# Patient Record
Sex: MALE | Race: WHITE
[De-identification: names, ages, dates, MRNs, and addresses within clinical notes are randomized per-mention and may not be internally consistent; named-entity substitution may affect disease eponyms.]

---

## 2017-05-12 ENCOUNTER — HOSPITAL ENCOUNTER (OUTPATIENT)
Dept: HOSPITAL 83 - US | Age: 82
Discharge: HOME | End: 2017-05-12
Attending: INTERNAL MEDICINE
Payer: MEDICARE

## 2017-05-12 DIAGNOSIS — E55.9: Primary | ICD-10-CM

## 2018-01-18 ENCOUNTER — HOSPITAL ENCOUNTER (EMERGENCY)
Dept: HOSPITAL 83 - ED | Age: 83
Discharge: HOME | End: 2018-01-18
Payer: MEDICARE

## 2018-01-18 VITALS — HEIGHT: 60 IN | WEIGHT: 180 LBS

## 2018-01-18 DIAGNOSIS — J18.8: Primary | ICD-10-CM

## 2018-01-18 DIAGNOSIS — Z79.899: ICD-10-CM

## 2018-01-18 DIAGNOSIS — E11.9: ICD-10-CM

## 2018-01-18 DIAGNOSIS — E78.5: ICD-10-CM

## 2018-01-18 DIAGNOSIS — I10: ICD-10-CM

## 2018-01-18 DIAGNOSIS — M10.9: ICD-10-CM

## 2018-01-18 LAB
ALBUMIN SERPL-MCNC: 3.2 GM/DL (ref 3.1–4.5)
ALP SERPL-CCNC: 75 U/L (ref 45–117)
ALT SERPL W P-5'-P-CCNC: 25 U/L (ref 12–78)
AST SERPL-CCNC: 18 IU/L (ref 3–35)
BASOPHILS # BLD AUTO: 0 10*3/UL (ref 0–0.1)
BASOPHILS NFR BLD AUTO: 0.5 % (ref 0–1)
BUN SERPL-MCNC: 24 MG/DL (ref 7–24)
CHLORIDE SERPL-SCNC: 102 MMOL/L (ref 98–107)
CREAT SERPL-MCNC: 1.54 MG/DL (ref 0.7–1.3)
EOSINOPHIL # BLD AUTO: 0.1 10*3/UL (ref 0–0.4)
EOSINOPHIL # BLD AUTO: 0.8 % (ref 1–4)
ERYTHROCYTE [DISTWIDTH] IN BLOOD BY AUTOMATED COUNT: 12.4 % (ref 0–14.5)
HCT VFR BLD AUTO: 40.9 % (ref 42–52)
HGB BLD-MCNC: 14.3 G/DL (ref 14–18)
LYMPHOCYTES # BLD AUTO: 1.7 10*3/UL (ref 1.3–4.4)
LYMPHOCYTES NFR BLD AUTO: 22 % (ref 27–41)
MCH RBC QN AUTO: 31.2 PG (ref 27–31)
MCHC RBC AUTO-ENTMCNC: 35 G/DL (ref 33–37)
MCV RBC AUTO: 89.1 FL (ref 80–94)
MONOCYTES # BLD AUTO: 0.5 10*3/UL (ref 0.1–1)
MONOCYTES NFR BLD MANUAL: 7 % (ref 3–9)
NEUT #: 5.3 10*3/UL (ref 2.3–7.9)
NEUT %: 69.4 % (ref 47–73)
NRBC BLD QL AUTO: 0 10*3/UL (ref 0–0)
PLATELET # BLD AUTO: 239 10*3/UL (ref 130–400)
PMV BLD AUTO: 9.5 FL (ref 9.6–12.3)
POTASSIUM SERPL-SCNC: 4.2 MMOL/L (ref 3.5–5.1)
PROT SERPL-MCNC: 6.9 GM/DL (ref 6.4–8.2)
RBC # BLD AUTO: 4.59 10*6/UL (ref 4.5–5.9)
SODIUM SERPL-SCNC: 137 MMOL/L (ref 136–145)
TROPONIN I SERPL-MCNC: < 0.015 NG/ML (ref ?–0.04)
WBC NRBC COR # BLD AUTO: 7.6 10*3/UL (ref 4.8–10.8)

## 2018-05-28 ENCOUNTER — HOSPITAL ENCOUNTER (EMERGENCY)
Dept: HOSPITAL 83 - ED | Age: 83
Discharge: HOME | End: 2018-05-28
Payer: MEDICARE

## 2018-05-28 VITALS — HEIGHT: 60 IN | WEIGHT: 175 LBS

## 2018-05-28 DIAGNOSIS — J01.90: Primary | ICD-10-CM

## 2018-05-28 DIAGNOSIS — Z79.899: ICD-10-CM

## 2018-05-28 LAB
ALBUMIN SERPL-MCNC: 3.6 GM/DL (ref 3.1–4.5)
ALP SERPL-CCNC: 85 U/L (ref 45–117)
ALT SERPL W P-5'-P-CCNC: 36 U/L (ref 12–78)
AST SERPL-CCNC: 26 IU/L (ref 3–35)
BASOPHILS # BLD AUTO: 0.1 10*3/UL (ref 0–0.1)
BASOPHILS NFR BLD AUTO: 1.2 % (ref 0–1)
BUN SERPL-MCNC: 14 MG/DL (ref 7–24)
CHLORIDE SERPL-SCNC: 99 MMOL/L (ref 98–107)
CREAT SERPL-MCNC: 1.58 MG/DL (ref 0.7–1.3)
EOSINOPHIL # BLD AUTO: 0.2 10*3/UL (ref 0–0.4)
EOSINOPHIL # BLD AUTO: 3.9 % (ref 1–4)
ERYTHROCYTE [DISTWIDTH] IN BLOOD BY AUTOMATED COUNT: 12.5 % (ref 0–14.5)
HCT VFR BLD AUTO: 45.8 % (ref 42–52)
HGB BLD-MCNC: 15.5 G/DL (ref 14–18)
LYMPHOCYTES # BLD AUTO: 0.9 10*3/UL (ref 1.3–4.4)
LYMPHOCYTES NFR BLD AUTO: 15.3 % (ref 27–41)
MCH RBC QN AUTO: 30.8 PG (ref 27–31)
MCHC RBC AUTO-ENTMCNC: 33.8 G/DL (ref 33–37)
MCV RBC AUTO: 91.1 FL (ref 80–94)
MONOCYTES # BLD AUTO: 0.8 10*3/UL (ref 0.1–1)
MONOCYTES NFR BLD MANUAL: 12.3 % (ref 3–9)
NEUT #: 4.1 10*3/UL (ref 2.3–7.9)
NEUT %: 67.1 % (ref 47–73)
NRBC BLD QL AUTO: 0 10*3/UL (ref 0–0)
PLATELET # BLD AUTO: 214 10*3/UL (ref 130–400)
PMV BLD AUTO: 9.2 FL (ref 9.6–12.3)
POTASSIUM SERPL-SCNC: 3.9 MMOL/L (ref 3.5–5.1)
PROT SERPL-MCNC: 7.5 GM/DL (ref 6.4–8.2)
RBC # BLD AUTO: 5.03 10*6/UL (ref 4.5–5.9)
SODIUM SERPL-SCNC: 134 MMOL/L (ref 136–145)
TROPONIN I SERPL-MCNC: < 0.015 NG/ML (ref ?–0.04)
WBC NRBC COR # BLD AUTO: 6.1 10*3/UL (ref 4.8–10.8)

## 2018-06-07 ENCOUNTER — HOSPITAL ENCOUNTER (EMERGENCY)
Dept: HOSPITAL 83 - ED | Age: 83
Discharge: HOME | End: 2018-06-07
Payer: MEDICARE

## 2018-06-07 VITALS — HEIGHT: 66.97 IN | WEIGHT: 175 LBS | BODY MASS INDEX: 27.47 KG/M2

## 2018-06-07 DIAGNOSIS — M17.0: ICD-10-CM

## 2018-06-07 DIAGNOSIS — Z79.899: ICD-10-CM

## 2018-06-07 DIAGNOSIS — M25.462: ICD-10-CM

## 2018-06-07 DIAGNOSIS — I25.2: ICD-10-CM

## 2018-06-07 DIAGNOSIS — Z90.49: ICD-10-CM

## 2018-06-07 DIAGNOSIS — M10.9: Primary | ICD-10-CM

## 2018-06-07 DIAGNOSIS — E11.9: ICD-10-CM

## 2018-06-07 DIAGNOSIS — E66.3: ICD-10-CM

## 2018-06-07 DIAGNOSIS — I10: ICD-10-CM

## 2018-06-07 DIAGNOSIS — E78.5: ICD-10-CM

## 2018-06-07 DIAGNOSIS — R05: ICD-10-CM

## 2018-06-07 LAB
ALBUMIN SERPL-MCNC: 3.2 GM/DL (ref 3.1–4.5)
ALP SERPL-CCNC: 84 U/L (ref 45–117)
ALT SERPL W P-5'-P-CCNC: 33 U/L (ref 12–78)
APPEARANCE UR: CLEAR
AST SERPL-CCNC: 21 IU/L (ref 3–35)
BACTERIA #/AREA URNS HPF: (no result) /[HPF]
BASOPHILS # BLD AUTO: 0.1 10*3/UL (ref 0–0.1)
BASOPHILS NFR BLD AUTO: 0.9 % (ref 0–1)
BILIRUB UR QL STRIP: NEGATIVE
BUN SERPL-MCNC: 13 MG/DL (ref 7–24)
CASTS URNS QL MICRO: (no result)
CHLORIDE SERPL-SCNC: 102 MMOL/L (ref 98–107)
COLOR UR: YELLOW
CREAT SERPL-MCNC: 1.58 MG/DL (ref 0.7–1.3)
EOSINOPHIL # BLD AUTO: 0.2 10*3/UL (ref 0–0.4)
EOSINOPHIL # BLD AUTO: 1.8 % (ref 1–4)
ERYTHROCYTE [DISTWIDTH] IN BLOOD BY AUTOMATED COUNT: 12 % (ref 0–14.5)
GLUCOSE UR QL: (no result)
HCT VFR BLD AUTO: 43.9 % (ref 42–52)
HGB BLD-MCNC: 14.8 G/DL (ref 14–18)
HGB UR QL STRIP: NEGATIVE
KETONES UR QL STRIP: NEGATIVE
LEUKOCYTE ESTERASE UR QL STRIP: NEGATIVE
LYMPHOCYTES # BLD AUTO: 1.4 10*3/UL (ref 1.3–4.4)
LYMPHOCYTES NFR BLD AUTO: 13.4 % (ref 27–41)
MCH RBC QN AUTO: 30.9 PG (ref 27–31)
MCHC RBC AUTO-ENTMCNC: 33.7 G/DL (ref 33–37)
MCV RBC AUTO: 91.6 FL (ref 80–94)
MONOCYTES # BLD AUTO: 0.7 10*3/UL (ref 0.1–1)
MONOCYTES NFR BLD MANUAL: 6.9 % (ref 3–9)
NEUT #: 7.7 10*3/UL (ref 2.3–7.9)
NEUT %: 76.7 % (ref 47–73)
NITRITE UR QL STRIP: NEGATIVE
NRBC BLD QL AUTO: 0 % (ref 0–0)
PH UR STRIP: 6 [PH] (ref 5–9)
PLATELET # BLD AUTO: 282 10*3/UL (ref 130–400)
PMV BLD AUTO: 9 FL (ref 9.6–12.3)
POTASSIUM SERPL-SCNC: 4.3 MMOL/L (ref 3.5–5.1)
PROT SERPL-MCNC: 7.3 GM/DL (ref 6.4–8.2)
RBC # BLD AUTO: 4.79 10*6/UL (ref 4.5–5.9)
RBC #/AREA URNS HPF: (no result) RBC/HPF (ref 0–2)
SODIUM SERPL-SCNC: 138 MMOL/L (ref 136–145)
SP GR UR: >= 1.03 (ref 1–1.03)
URATE SERPL-MCNC: 6.9 MG/DL (ref 3.5–7.2)
UROBILINOGEN UR STRIP-MCNC: 0.2 E.U./DL (ref 0.2–1)
WBC #/AREA URNS HPF: (no result) WBC/HPF (ref 0–5)
WBC NRBC COR # BLD AUTO: 10.1 10*3/UL (ref 4.8–10.8)

## 2018-06-30 ENCOUNTER — HOSPITAL ENCOUNTER (EMERGENCY)
Dept: GENERAL RADIOLOGY | Age: 83
Discharge: HOME | End: 2018-06-30
Payer: MEDICARE

## 2018-06-30 ENCOUNTER — HOSPITAL ENCOUNTER (EMERGENCY)
Age: 83
Discharge: HOME | End: 2018-06-30
Payer: MEDICARE

## 2018-06-30 VITALS
HEART RATE: 69 BPM | DIASTOLIC BLOOD PRESSURE: 72 MMHG | RESPIRATION RATE: 26 BRPM | BODY MASS INDEX: 24.17 KG/M2 | WEIGHT: 154 LBS | SYSTOLIC BLOOD PRESSURE: 145 MMHG | HEIGHT: 67 IN | TEMPERATURE: 98.3 F | OXYGEN SATURATION: 91 %

## 2018-06-30 DIAGNOSIS — J44.1 COPD EXACERBATION (HCC): Primary | ICD-10-CM

## 2018-06-30 LAB
ANION GAP SERPL CALCULATED.3IONS-SCNC: 9 MEQ/L (ref 8–16)
BASOPHILS # BLD: 0.6 %
BASOPHILS ABSOLUTE: 0 THOU/MM3 (ref 0–0.1)
BUN BLDV-MCNC: 12 MG/DL (ref 7–22)
CHLORIDE BLD-SCNC: 100 MEQ/L (ref 98–111)
CO2: 29 MEQ/L (ref 23–33)
CREAT SERPL-MCNC: 1 MG/DL (ref 0.4–1.2)
EKG ATRIAL RATE: 68 BPM
EKG P AXIS: 61 DEGREES
EKG P-R INTERVAL: 138 MS
EKG Q-T INTERVAL: 382 MS
EKG QRS DURATION: 86 MS
EKG QTC CALCULATION (BAZETT): 406 MS
EKG R AXIS: 40 DEGREES
EKG T AXIS: 60 DEGREES
EKG VENTRICULAR RATE: 68 BPM
EOSINOPHIL # BLD: 1.2 %
EOSINOPHILS ABSOLUTE: 0.1 THOU/MM3 (ref 0–0.4)
ERYTHROCYTE [DISTWIDTH] IN BLOOD BY AUTOMATED COUNT: 13.8 % (ref 11.5–14.5)
ERYTHROCYTE [DISTWIDTH] IN BLOOD BY AUTOMATED COUNT: 49.6 FL (ref 35–45)
GFR SERPL CREATININE-BSD FRML MDRD: 72 ML/MIN/1.73M2
GLUCOSE BLD-MCNC: 119 MG/DL (ref 70–108)
HCT VFR BLD CALC: 44 % (ref 42–52)
HEMOGLOBIN: 14.4 GM/DL (ref 14–18)
IMMATURE GRANS (ABS): 0.04 THOU/MM3 (ref 0–0.07)
IMMATURE GRANULOCYTES: 0.5 %
LYMPHOCYTES # BLD: 10.2 %
LYMPHOCYTES ABSOLUTE: 0.8 THOU/MM3 (ref 1–4.8)
MCH RBC QN AUTO: 32 PG (ref 26–33)
MCHC RBC AUTO-ENTMCNC: 32.7 GM/DL (ref 32.2–35.5)
MCV RBC AUTO: 97.8 FL (ref 80–94)
MONOCYTES # BLD: 10.7 %
MONOCYTES ABSOLUTE: 0.9 THOU/MM3 (ref 0.4–1.3)
NUCLEATED RED BLOOD CELLS: 0 /100 WBC
PLATELET # BLD: 178 THOU/MM3 (ref 130–400)
PMV BLD AUTO: 10.7 FL (ref 9.4–12.4)
POTASSIUM SERPL-SCNC: 4.2 MEQ/L (ref 3.5–5.2)
RBC # BLD: 4.5 MILL/MM3 (ref 4.7–6.1)
SEG NEUTROPHILS: 76.8 %
SEGMENTED NEUTROPHILS ABSOLUTE COUNT: 6.4 THOU/MM3 (ref 1.8–7.7)
SODIUM BLD-SCNC: 138 MEQ/L (ref 135–145)
WBC # BLD: 8.3 THOU/MM3 (ref 4.8–10.8)

## 2018-06-30 PROCEDURE — 80051 ELECTROLYTE PANEL: CPT

## 2018-06-30 PROCEDURE — 71046 X-RAY EXAM CHEST 2 VIEWS: CPT

## 2018-06-30 PROCEDURE — 93005 ELECTROCARDIOGRAM TRACING: CPT | Performed by: NURSE PRACTITIONER

## 2018-06-30 PROCEDURE — 94640 AIRWAY INHALATION TREATMENT: CPT

## 2018-06-30 PROCEDURE — 96372 THER/PROPH/DIAG INJ SC/IM: CPT

## 2018-06-30 PROCEDURE — 6360000002 HC RX W HCPCS: Performed by: NURSE PRACTITIONER

## 2018-06-30 PROCEDURE — 82565 ASSAY OF CREATININE: CPT

## 2018-06-30 PROCEDURE — 99214 OFFICE O/P EST MOD 30 MIN: CPT | Performed by: NURSE PRACTITIONER

## 2018-06-30 PROCEDURE — 6370000000 HC RX 637 (ALT 250 FOR IP)

## 2018-06-30 PROCEDURE — 85025 COMPLETE CBC W/AUTO DIFF WBC: CPT

## 2018-06-30 PROCEDURE — 99215 OFFICE O/P EST HI 40 MIN: CPT

## 2018-06-30 PROCEDURE — 84520 ASSAY OF UREA NITROGEN: CPT

## 2018-06-30 PROCEDURE — 36415 COLL VENOUS BLD VENIPUNCTURE: CPT

## 2018-06-30 PROCEDURE — 82947 ASSAY GLUCOSE BLOOD QUANT: CPT

## 2018-06-30 PROCEDURE — 93010 ELECTROCARDIOGRAM REPORT: CPT | Performed by: NUCLEAR MEDICINE

## 2018-06-30 RX ORDER — METHYLPREDNISOLONE SODIUM SUCCINATE 125 MG/2ML
125 INJECTION, POWDER, LYOPHILIZED, FOR SOLUTION INTRAMUSCULAR; INTRAVENOUS ONCE
Status: COMPLETED | OUTPATIENT
Start: 2018-06-30 | End: 2018-06-30

## 2018-06-30 RX ORDER — IPRATROPIUM BROMIDE AND ALBUTEROL SULFATE 2.5; .5 MG/3ML; MG/3ML
SOLUTION RESPIRATORY (INHALATION)
Status: COMPLETED
Start: 2018-06-30 | End: 2018-06-30

## 2018-06-30 RX ORDER — IPRATROPIUM BROMIDE AND ALBUTEROL SULFATE 2.5; .5 MG/3ML; MG/3ML
1 SOLUTION RESPIRATORY (INHALATION) EVERY 6 HOURS
Qty: 360 ML | Refills: 0 | Status: SHIPPED | OUTPATIENT
Start: 2018-06-30

## 2018-06-30 RX ORDER — IPRATROPIUM BROMIDE AND ALBUTEROL SULFATE 2.5; .5 MG/3ML; MG/3ML
1 SOLUTION RESPIRATORY (INHALATION) ONCE
Status: DISCONTINUED | OUTPATIENT
Start: 2018-06-30 | End: 2018-06-30 | Stop reason: HOSPADM

## 2018-06-30 RX ORDER — IPRATROPIUM BROMIDE AND ALBUTEROL SULFATE 2.5; .5 MG/3ML; MG/3ML
1 SOLUTION RESPIRATORY (INHALATION)
Status: DISCONTINUED | OUTPATIENT
Start: 2018-06-30 | End: 2018-06-30

## 2018-06-30 RX ORDER — AZITHROMYCIN 200 MG/5ML
500 POWDER, FOR SUSPENSION ORAL DAILY
Qty: 62.5 ML | Refills: 0 | Status: SHIPPED | OUTPATIENT
Start: 2018-06-30 | End: 2018-07-05

## 2018-06-30 RX ORDER — PREDNISOLONE 15 MG/5 ML
50 SOLUTION, ORAL ORAL DAILY
Qty: 83.5 ML | Refills: 0 | Status: SHIPPED | OUTPATIENT
Start: 2018-06-30 | End: 2018-07-05

## 2018-06-30 RX ADMIN — IPRATROPIUM BROMIDE AND ALBUTEROL SULFATE 3 ML: .5; 3 SOLUTION RESPIRATORY (INHALATION) at 09:07

## 2018-06-30 RX ADMIN — METHYLPREDNISOLONE SODIUM SUCCINATE 125 MG: 125 INJECTION, POWDER, FOR SOLUTION INTRAMUSCULAR; INTRAVENOUS at 09:41

## 2018-06-30 ASSESSMENT — ENCOUNTER SYMPTOMS
COUGH: 1
DIARRHEA: 0
VOMITING: 0
CHEST TIGHTNESS: 1
ABDOMINAL PAIN: 0
SHORTNESS OF BREATH: 1
EYE ITCHING: 0
EYE DISCHARGE: 0
NAUSEA: 0
WHEEZING: 1
ALLERGIC/IMMUNOLOGIC NEGATIVE: 1
EYE PAIN: 0
ABDOMINAL DISTENTION: 0
SORE THROAT: 1

## 2018-06-30 NOTE — ED PROVIDER NOTES
Jamar Marsh 6961  Urgent Care Encounter      CHIEF COMPLAINT       Chief Complaint   Patient presents with    Shortness of Breath     x 3 days    Cough       Nurses Notes reviewed and I agree except as noted in the HPI. HISTORY OF PRESENT ILLNESS   Kamlesh Rhodes is a 81 y.o. male who presents   Cough   Cough characteristics:  Productive, croupy, hacking, hoarse, vomit-inducing and harsh  Sputum characteristics:  Mahesh, frothy and gray  Severity:  Severe  Onset quality:  Gradual  Duration:  3 days  Timing:  Constant  Progression:  Worsening  Chronicity:  Chronic  Smoker: yes    Context: weather changes    Context: not animal exposure, not exposure to allergens, not occupational exposure, not sick contacts and not upper respiratory infection    Relieved by:  Nothing  Worsened by:  Deep breathing, environmental changes, lying down, smoking and activity  Ineffective treatments:  Beta-agonist inhaler, fluids and rest  Associated symptoms: headaches, myalgias, shortness of breath, sore throat and wheezing    Associated symptoms: no chest pain, no chills, no ear pain, no eye discharge and no fever    Shortness of breath:     Severity:  Severe    Onset quality:  Gradual    Duration:  3 days    Timing:  Constant    Progression:  Worsening  Wheezing:     Severity:  Severe    Onset quality:  Gradual    Duration:  3 days    Timing:  Constant    Progression:  Worsening    Chronicity:  Chronic  Risk factors: no chemical exposure and no recent infection          REVIEW OF SYSTEMS     Review of Systems   Constitutional: Positive for activity change, appetite change and fatigue. Negative for chills and fever. HENT: Positive for congestion and sore throat. Negative for ear pain. Eyes: Negative for pain, discharge and itching. Respiratory: Positive for cough, chest tightness, shortness of breath and wheezing. Cardiovascular: Positive for palpitations. Negative for chest pain and leg swelling. oriented to person, place, and time. He appears well-developed and well-nourished. He appears distressed. HENT:   Head: Normocephalic. Right Ear: External ear normal.   Left Ear: External ear normal.   Mouth/Throat: No oropharyngeal exudate. Eyes: No scleral icterus. Neck: Normal range of motion. Neck supple. No JVD present. Cardiovascular: Normal rate, regular rhythm, normal heart sounds and intact distal pulses. Pulmonary/Chest: No accessory muscle usage or stridor. Tachypnea noted. No apnea and no bradypnea. No respiratory distress. He has decreased breath sounds in the right middle field, the right lower field and the left lower field. He has wheezes in the right upper field, the right middle field, the right lower field, the left upper field and the left middle field. He has rhonchi in the right upper field, the right middle field, the left upper field and the left middle field. He has no rales. He exhibits no tenderness. Abdominal: Soft. He exhibits distension. Musculoskeletal: Normal range of motion. Lymphadenopathy:     He has no cervical adenopathy. Neurological: He is alert and oriented to person, place, and time. Skin: Skin is warm and dry. No rash noted. No erythema. No pallor. Psychiatric: He has a normal mood and affect. His behavior is normal. Judgment and thought content normal.   Nursing note and vitals reviewed.       DIAGNOSTIC RESULTS   Labs:  Results for orders placed or performed during the hospital encounter of 71/11/56   Basic Metabolic Panel, Without Calcium   Result Value Ref Range    Sodium 138 135 - 145 meq/L    Potassium 4.2 3.5 - 5.2 meq/L    Chloride 100 98 - 111 meq/L    CO2 29 23 - 33 meq/L    Glucose 119 (H) 70 - 108 mg/dL    BUN 12 7 - 22 mg/dL    CREATININE 1.0 0.4 - 1.2 mg/dL   Anion Gap   Result Value Ref Range    Anion Gap 9.0 8.0 - 16.0 meq/L   Glomerular Filtration Rate, Estimated   Result Value Ref Range    Est, Glom Filt Rate 72 (A) ml/min/1.73m2 EKG 12 Lead   Result Value Ref Range    Ventricular Rate 68 BPM    Atrial Rate 68 BPM    P-R Interval 138 ms    QRS Duration 86 ms    Q-T Interval 382 ms    QTc Calculation (Bazett) 406 ms    P Axis 61 degrees    R Axis 40 degrees    T Axis 60 degrees       IMAGING:  XR CHEST STANDARD (2 VW)   Final Result   1. No acute intrathoracic process. 2. Chronic lung disease. **This report has been created using voice recognition software. It may contain minor errors which are inherent in voice recognition technology. **      Final report electronically signed by Dr. Dimitri Gutierrez on 6/30/2018 9:41 AM        URGENT CARE COURSE:     Vitals:    06/30/18 0849 06/30/18 0900 06/30/18 0919 06/30/18 0946   BP: (!) 158/80  (!) 140/67 (!) 140/68   Pulse: 72  63 67   Resp: 24  (!) 96 30   Temp: 98.3 °F (36.8 °C)      TempSrc: Oral      SpO2: 90% 94% 96% 95%   Weight: 154 lb (69.9 kg)      Height: 5' 7\" (1.702 m)          Medications   ipratropium-albuterol (DUONEB) nebulizer solution 1 ampule (not administered)   ipratropium-albuterol (DUONEB) 0.5-2.5 (3) MG/3ML nebulizer solution (3 mLs  Given 6/30/18 0907)   methylPREDNISolone sodium (SOLU-MEDROL) injection 125 mg (125 mg Intravenous Given 6/30/18 0941)     PROCEDURES:  None  FINAL IMPRESSION      1. COPD exacerbation (HCC)      Responded to ordered treatment and Sats were above 90% with oxygen. Xray shows severe COPD. Once oxygen removed, pt dropped to sats no greater than 86%. Pt was advised he would be transferred to [facility] for further evaluation despite his remarks he was feeling better. Pt refused to go by ambulance or private car to the [facility] or [facility] ER at this time. Pt was given ample time to consider his decision and despite repeated counseling regarding the dangers of returning home with low oxygen saturation, and a history of not taking his ordered medications, he continues to decline being evaluated at [facility] ER for further care.      Has been instructed to

## 2018-06-30 NOTE — ED TRIAGE NOTES
Pt to room 5 with wife and c/o extra shortness of breath in the last 3 days. He also states he is coughing and it is sometimes productive. EKG performed and results given to Geovanny Yee CNP. Pt states he has COPD and does not take any kind of medications but has taken mucinex in the last few days to try and clear the congestion.

## 2018-06-30 NOTE — ED NOTES
Pt oxygen saturation 86-88% in room air. liyah amezquita cnp in room discussing plan of care with pt and spouse.      Ekaterina Martinez RN  06/30/18 1379

## 2018-06-30 NOTE — ED NOTES
Discharge instructions reviewed with patient and wife. Patient informed to go to ER for worsening symptoms and to follow up with PCP. Pt ambulatory out to front lobby with wife.       Ronny Abarca RN  06/30/18 2864

## 2019-02-11 ENCOUNTER — HOSPITAL ENCOUNTER (OUTPATIENT)
Dept: HOSPITAL 83 - US | Age: 84
Discharge: HOME | End: 2019-02-11
Attending: INTERNAL MEDICINE
Payer: MEDICARE

## 2019-02-11 DIAGNOSIS — I10: ICD-10-CM

## 2019-02-11 DIAGNOSIS — I73.9: Primary | ICD-10-CM

## 2019-02-11 DIAGNOSIS — M79.604: ICD-10-CM

## 2019-02-11 DIAGNOSIS — E11.9: ICD-10-CM

## 2019-07-01 ENCOUNTER — HOSPITAL ENCOUNTER (OUTPATIENT)
Dept: HOSPITAL 83 - LAB | Age: 84
Discharge: HOME | End: 2019-07-01
Attending: FAMILY MEDICINE
Payer: MEDICARE

## 2019-07-01 DIAGNOSIS — E11.9: Primary | ICD-10-CM

## 2019-07-01 DIAGNOSIS — I10: ICD-10-CM

## 2019-07-01 LAB
BASOPHILS # BLD AUTO: 0.1 10*3/UL (ref 0–0.1)
BASOPHILS NFR BLD AUTO: 1.1 % (ref 0–1)
BUN SERPL-MCNC: 17 MG/DL (ref 7–24)
CHLORIDE SERPL-SCNC: 103 MMOL/L (ref 98–107)
CHOLEST SERPL-MCNC: 123 MG/DL (ref ?–200)
CREAT SERPL-MCNC: 1.61 MG/DL (ref 0.7–1.3)
EOSINOPHIL # BLD AUTO: 0.2 10*3/UL (ref 0–0.4)
EOSINOPHIL # BLD AUTO: 2.5 % (ref 1–4)
ERYTHROCYTE [DISTWIDTH] IN BLOOD BY AUTOMATED COUNT: 12.8 % (ref 0–14.5)
HCT VFR BLD AUTO: 41.9 % (ref 42–52)
HDLC SERPL-MCNC: 47 MG/DL (ref 40–60)
HGB BLD-MCNC: 13.7 G/DL (ref 14–18)
LDLC SERPL DIRECT ASSAY-MCNC: 42 MG/DL (ref 9–159)
LYMPHOCYTES # BLD AUTO: 1.6 10*3/UL (ref 1.3–4.4)
LYMPHOCYTES NFR BLD AUTO: 24.4 % (ref 27–41)
MCH RBC QN AUTO: 30.6 PG (ref 27–31)
MCHC RBC AUTO-ENTMCNC: 32.7 G/DL (ref 33–37)
MCV RBC AUTO: 93.5 FL (ref 80–94)
MONOCYTES # BLD AUTO: 0.5 10*3/UL (ref 0.1–1)
MONOCYTES NFR BLD MANUAL: 7.3 % (ref 3–9)
NEUT #: 4.2 10*3/UL (ref 2.3–7.9)
NEUT %: 64.2 % (ref 47–73)
NRBC BLD QL AUTO: 0 10*3/UL (ref 0–0)
PLATELET # BLD AUTO: 230 10*3/UL (ref 130–400)
PMV BLD AUTO: 9.4 FL (ref 9.6–12.3)
POTASSIUM SERPL-SCNC: 3.9 MMOL/L (ref 3.5–5.1)
RBC # BLD AUTO: 4.48 10*6/UL (ref 4.5–5.9)
SODIUM SERPL-SCNC: 141 MMOL/L (ref 136–145)
TRIGL SERPL-MCNC: 168 MG/DL (ref ?–150)
TSH SERPL DL<=0.005 MIU/L-ACNC: 0.92 UIU/ML (ref 0.36–4.75)
VLDLC SERPL CALC-MCNC: 34 MG/DL (ref 6–40)
WBC NRBC COR # BLD AUTO: 6.5 10*3/UL (ref 4.8–10.8)

## 2019-11-27 ENCOUNTER — HOSPITAL ENCOUNTER (OUTPATIENT)
Dept: HOSPITAL 83 - LAB | Age: 84
Discharge: HOME | End: 2019-11-27
Attending: FAMILY MEDICINE
Payer: MEDICARE

## 2019-11-27 DIAGNOSIS — E11.9: ICD-10-CM

## 2019-11-27 DIAGNOSIS — E78.2: Primary | ICD-10-CM

## 2019-11-27 DIAGNOSIS — I10: ICD-10-CM

## 2019-11-27 LAB
BUN SERPL-MCNC: 18 MG/DL (ref 7–24)
CHLORIDE SERPL-SCNC: 101 MMOL/L (ref 98–107)
CHOLEST SERPL-MCNC: 142 MG/DL (ref ?–200)
CREAT SERPL-MCNC: 1.57 MG/DL (ref 0.7–1.3)
HDLC SERPL-MCNC: 49 MG/DL (ref 40–60)
LDLC SERPL DIRECT ASSAY-MCNC: 64 MG/DL (ref 9–159)
POTASSIUM SERPL-SCNC: 3.9 MMOL/L (ref 3.5–5.1)
SODIUM SERPL-SCNC: 137 MMOL/L (ref 136–145)
TRIGL SERPL-MCNC: 147 MG/DL (ref ?–150)
VLDLC SERPL CALC-MCNC: 29 MG/DL (ref 6–40)

## 2020-02-24 ENCOUNTER — HOSPITAL ENCOUNTER (OUTPATIENT)
Dept: HOSPITAL 83 - LAB | Age: 85
Discharge: HOME | End: 2020-02-24
Attending: FAMILY MEDICINE
Payer: MEDICARE

## 2020-02-24 DIAGNOSIS — E11.9: Primary | ICD-10-CM

## 2020-02-24 LAB
BUN SERPL-MCNC: 25 MG/DL (ref 7–24)
CHLORIDE SERPL-SCNC: 105 MMOL/L (ref 98–107)
CREAT SERPL-MCNC: 1.7 MG/DL (ref 0.7–1.3)
POTASSIUM SERPL-SCNC: 3.9 MMOL/L (ref 3.5–5.1)
SODIUM SERPL-SCNC: 140 MMOL/L (ref 136–145)

## 2020-06-09 ENCOUNTER — HOSPITAL ENCOUNTER (EMERGENCY)
Dept: HOSPITAL 83 - ED | Age: 85
Discharge: ANOTHER HOSPITAL | End: 2020-06-09
Payer: MEDICARE

## 2020-06-09 VITALS — HEIGHT: 70 IN | WEIGHT: 186 LBS | BODY MASS INDEX: 26.63 KG/M2

## 2020-06-09 DIAGNOSIS — I10: ICD-10-CM

## 2020-06-09 DIAGNOSIS — I16.9: ICD-10-CM

## 2020-06-09 DIAGNOSIS — Z79.899: ICD-10-CM

## 2020-06-09 DIAGNOSIS — F32.9: ICD-10-CM

## 2020-06-09 DIAGNOSIS — M10.9: ICD-10-CM

## 2020-06-09 DIAGNOSIS — E11.9: ICD-10-CM

## 2020-06-09 DIAGNOSIS — I21.11: Primary | ICD-10-CM

## 2020-06-09 DIAGNOSIS — I25.10: ICD-10-CM

## 2020-06-09 LAB
ALBUMIN SERPL-MCNC: 3.6 GM/DL (ref 3.1–4.5)
ALP SERPL-CCNC: 93 U/L (ref 45–117)
ALT SERPL W P-5'-P-CCNC: 23 U/L (ref 12–78)
APTT PPP: 27.9 SECONDS (ref 20–32.1)
AST SERPL-CCNC: 27 IU/L (ref 3–35)
BASOPHILS # BLD AUTO: 0.1 10*3/UL (ref 0–0.1)
BASOPHILS NFR BLD AUTO: 1 % (ref 0–1)
BUN SERPL-MCNC: 25 MG/DL (ref 7–24)
CHLORIDE SERPL-SCNC: 100 MMOL/L (ref 98–107)
CREAT SERPL-MCNC: 1.78 MG/DL (ref 0.7–1.3)
EOSINOPHIL # BLD AUTO: 0.2 10*3/UL (ref 0–0.4)
EOSINOPHIL # BLD AUTO: 2.4 % (ref 1–4)
ERYTHROCYTE [DISTWIDTH] IN BLOOD BY AUTOMATED COUNT: 12.7 % (ref 0–14.5)
HCT VFR BLD AUTO: 46 % (ref 42–52)
INR BLD: 1 (ref 2–3.5)
LYMPHOCYTES # BLD AUTO: 1.9 10*3/UL (ref 1.3–4.4)
LYMPHOCYTES NFR BLD AUTO: 20.8 % (ref 27–41)
MCH RBC QN AUTO: 30 PG (ref 27–31)
MCHC RBC AUTO-ENTMCNC: 32.8 G/DL (ref 33–37)
MCV RBC AUTO: 91.3 FL (ref 80–94)
MONOCYTES # BLD AUTO: 0.7 10*3/UL (ref 0.1–1)
MONOCYTES NFR BLD MANUAL: 7.5 % (ref 3–9)
NEUT #: 6.1 10*3/UL (ref 2.3–7.9)
NEUT %: 67.6 % (ref 47–73)
NRBC BLD QL AUTO: 0 10*3/UL (ref 0–0)
PLATELET # BLD AUTO: 270 10*3/UL (ref 130–400)
PMV BLD AUTO: 9.6 FL (ref 9.6–12.3)
POTASSIUM SERPL-SCNC: 3.9 MMOL/L (ref 3.5–5.1)
PROT SERPL-MCNC: 8.4 GM/DL (ref 6.4–8.2)
RBC # BLD AUTO: 5.04 10*6/UL (ref 4.5–5.9)
SODIUM SERPL-SCNC: 136 MMOL/L (ref 136–145)
TROPONIN I SERPL-MCNC: 0.15 NG/ML (ref ?–0.04)
WBC NRBC COR # BLD AUTO: 9.1 10*3/UL (ref 4.8–10.8)

## 2020-09-07 ENCOUNTER — HOSPITAL ENCOUNTER (EMERGENCY)
Dept: HOSPITAL 83 - ED | Age: 85
Discharge: HOME | End: 2020-09-07
Payer: MEDICARE

## 2020-09-07 VITALS — HEIGHT: 66.97 IN | BODY MASS INDEX: 28.25 KG/M2 | WEIGHT: 180 LBS

## 2020-09-07 DIAGNOSIS — E78.5: ICD-10-CM

## 2020-09-07 DIAGNOSIS — M10.9: ICD-10-CM

## 2020-09-07 DIAGNOSIS — Z79.4: ICD-10-CM

## 2020-09-07 DIAGNOSIS — N18.9: ICD-10-CM

## 2020-09-07 DIAGNOSIS — I12.9: ICD-10-CM

## 2020-09-07 DIAGNOSIS — Z79.899: ICD-10-CM

## 2020-09-07 DIAGNOSIS — E11.22: ICD-10-CM

## 2020-09-07 DIAGNOSIS — R41.82: ICD-10-CM

## 2020-09-07 DIAGNOSIS — E11.649: Primary | ICD-10-CM

## 2020-09-07 DIAGNOSIS — M19.90: ICD-10-CM

## 2020-09-07 DIAGNOSIS — Z79.82: ICD-10-CM

## 2020-09-07 LAB
ALBUMIN SERPL-MCNC: 3.2 GM/DL (ref 3.1–4.5)
ALP SERPL-CCNC: 61 U/L (ref 45–117)
ALT SERPL W P-5'-P-CCNC: 15 U/L (ref 12–78)
APPEARANCE UR: CLEAR
AST SERPL-CCNC: 17 IU/L (ref 3–35)
BACTERIA #/AREA URNS HPF: (no result) /[HPF]
BASOPHILS # BLD AUTO: 0 10*3/UL (ref 0–0.1)
BASOPHILS NFR BLD AUTO: 0.5 % (ref 0–1)
BILIRUB UR QL STRIP: NEGATIVE
BUN SERPL-MCNC: 27 MG/DL (ref 7–24)
CHLORIDE SERPL-SCNC: 107 MMOL/L (ref 98–107)
COLOR UR: YELLOW
CREAT SERPL-MCNC: 1.88 MG/DL (ref 0.7–1.3)
EOSINOPHIL # BLD AUTO: 0.1 10*3/UL (ref 0–0.4)
EOSINOPHIL # BLD AUTO: 0.6 % (ref 1–4)
EPI CELLS #/AREA URNS HPF: (no result) /[HPF]
ERYTHROCYTE [DISTWIDTH] IN BLOOD BY AUTOMATED COUNT: 14.1 % (ref 0–14.5)
GLUCOSE UR QL: (no result)
HCT VFR BLD AUTO: 38.3 % (ref 42–52)
HGB UR QL STRIP: NEGATIVE
KETONES UR QL STRIP: NEGATIVE
LEUKOCYTE ESTERASE UR QL STRIP: NEGATIVE
LYMPHOCYTES # BLD AUTO: 0.6 10*3/UL (ref 1.3–4.4)
LYMPHOCYTES NFR BLD AUTO: 6.8 % (ref 27–41)
MCH RBC QN AUTO: 29.3 PG (ref 27–31)
MCHC RBC AUTO-ENTMCNC: 31.9 G/DL (ref 33–37)
MCV RBC AUTO: 91.8 FL (ref 80–94)
MONOCYTES # BLD AUTO: 0.4 10*3/UL (ref 0.1–1)
MONOCYTES NFR BLD MANUAL: 4.5 % (ref 3–9)
NEUT #: 7.4 10*3/UL (ref 2.3–7.9)
NEUT %: 86.2 % (ref 47–73)
NITRITE UR QL STRIP: NEGATIVE
NRBC BLD QL AUTO: 0 % (ref 0–0)
PH UR STRIP: 6.5 [PH] (ref 4.5–8)
PLATELET # BLD AUTO: 216 10*3/UL (ref 130–400)
PMV BLD AUTO: 9.7 FL (ref 9.6–12.3)
POTASSIUM SERPL-SCNC: 4.3 MMOL/L (ref 3.5–5.1)
PROT SERPL-MCNC: 7.3 GM/DL (ref 6.4–8.2)
RBC # BLD AUTO: 4.17 10*6/UL (ref 4.5–5.9)
RBC #/AREA URNS HPF: (no result) RBC/HPF (ref 0–2)
SODIUM SERPL-SCNC: 142 MMOL/L (ref 136–145)
SP GR UR: 1.01 (ref 1–1.03)
UROBILINOGEN UR STRIP-MCNC: 1 E.U./DL (ref 0–1)
WBC #/AREA URNS HPF: (no result) WBC/HPF (ref 0–5)
WBC NRBC COR # BLD AUTO: 8.6 10*3/UL (ref 4.8–10.8)

## 2020-10-14 ENCOUNTER — HOSPITAL ENCOUNTER (OUTPATIENT)
Dept: HOSPITAL 83 - US | Age: 85
Discharge: HOME | End: 2020-10-14
Attending: INTERNAL MEDICINE
Payer: MEDICARE

## 2020-10-14 DIAGNOSIS — I70.202: Primary | ICD-10-CM

## 2020-12-11 ENCOUNTER — HOSPITAL ENCOUNTER (OUTPATIENT)
Dept: HOSPITAL 83 - CT | Age: 85
Discharge: HOME | End: 2020-12-11
Attending: PHYSICIAN ASSISTANT
Payer: MEDICARE

## 2020-12-11 DIAGNOSIS — M54.41: ICD-10-CM

## 2020-12-11 DIAGNOSIS — N26.1: ICD-10-CM

## 2020-12-11 DIAGNOSIS — K80.20: Primary | ICD-10-CM

## 2020-12-11 LAB — CREAT SERPL-MCNC: 1.73 MG/DL (ref 0.7–1.3)

## 2020-12-28 ENCOUNTER — HOSPITAL ENCOUNTER (OUTPATIENT)
Dept: HOSPITAL 83 - MRI | Age: 85
Discharge: HOME | End: 2020-12-28
Attending: PHYSICIAN ASSISTANT
Payer: MEDICARE

## 2020-12-28 DIAGNOSIS — M48.062: ICD-10-CM

## 2020-12-28 DIAGNOSIS — M89.38: ICD-10-CM

## 2020-12-28 DIAGNOSIS — M47.816: Primary | ICD-10-CM

## 2021-08-12 ENCOUNTER — HOSPITAL ENCOUNTER (OUTPATIENT)
Dept: HOSPITAL 83 - SDC | Age: 86
Discharge: HOME | End: 2021-08-12
Attending: SURGERY
Payer: MEDICARE

## 2021-08-12 VITALS — DIASTOLIC BLOOD PRESSURE: 62 MMHG | SYSTOLIC BLOOD PRESSURE: 131 MMHG

## 2021-08-12 VITALS — WEIGHT: 186 LBS | HEIGHT: 67.99 IN | BODY MASS INDEX: 28.19 KG/M2

## 2021-08-12 VITALS — DIASTOLIC BLOOD PRESSURE: 96 MMHG | SYSTOLIC BLOOD PRESSURE: 195 MMHG

## 2021-08-12 VITALS — DIASTOLIC BLOOD PRESSURE: 75 MMHG | SYSTOLIC BLOOD PRESSURE: 156 MMHG

## 2021-08-12 VITALS — DIASTOLIC BLOOD PRESSURE: 70 MMHG

## 2021-08-12 DIAGNOSIS — Z86.73: ICD-10-CM

## 2021-08-12 DIAGNOSIS — Z95.5: ICD-10-CM

## 2021-08-12 DIAGNOSIS — I10: ICD-10-CM

## 2021-08-12 DIAGNOSIS — Z79.899: ICD-10-CM

## 2021-08-12 DIAGNOSIS — Z90.49: ICD-10-CM

## 2021-08-12 DIAGNOSIS — K59.00: Primary | ICD-10-CM

## 2021-08-12 DIAGNOSIS — M19.90: ICD-10-CM

## 2021-08-12 DIAGNOSIS — I25.10: ICD-10-CM

## 2021-08-12 DIAGNOSIS — F32.9: ICD-10-CM

## 2021-08-12 DIAGNOSIS — M10.9: ICD-10-CM

## 2021-08-12 DIAGNOSIS — E78.5: ICD-10-CM

## 2021-08-12 DIAGNOSIS — K57.30: ICD-10-CM

## 2021-08-12 DIAGNOSIS — I25.2: ICD-10-CM

## 2021-08-12 DIAGNOSIS — E11.9: ICD-10-CM

## 2021-08-17 ENCOUNTER — HOSPITAL ENCOUNTER (OUTPATIENT)
Dept: HOSPITAL 83 - RAD | Age: 86
Discharge: HOME | End: 2021-08-17
Attending: SURGERY
Payer: MEDICARE

## 2021-08-17 DIAGNOSIS — K59.00: Primary | ICD-10-CM

## 2022-01-20 ENCOUNTER — HOSPITAL ENCOUNTER (OUTPATIENT)
Dept: HOSPITAL 83 - RAD | Age: 87
Discharge: HOME | End: 2022-01-20
Attending: INTERNAL MEDICINE
Payer: MEDICARE

## 2022-01-20 DIAGNOSIS — M48.061: ICD-10-CM

## 2022-01-20 DIAGNOSIS — Z01.818: Primary | ICD-10-CM

## 2022-01-20 DIAGNOSIS — Z01.810: ICD-10-CM

## 2022-08-15 ENCOUNTER — APPOINTMENT (OUTPATIENT)
Dept: GENERAL RADIOLOGY | Age: 87
End: 2022-08-15
Payer: MEDICARE

## 2022-08-15 ENCOUNTER — HOSPITAL ENCOUNTER (EMERGENCY)
Age: 87
Discharge: HOME | End: 2022-08-15
Attending: FAMILY MEDICINE
Payer: MEDICARE

## 2022-08-15 VITALS
TEMPERATURE: 98.4 F | RESPIRATION RATE: 18 BRPM | SYSTOLIC BLOOD PRESSURE: 154 MMHG | DIASTOLIC BLOOD PRESSURE: 81 MMHG | HEART RATE: 66 BPM | OXYGEN SATURATION: 93 %

## 2022-08-15 DIAGNOSIS — U07.1 COVID: ICD-10-CM

## 2022-08-15 DIAGNOSIS — R05.9 COUGH: Primary | ICD-10-CM

## 2022-08-15 PROCEDURE — 99283 EMERGENCY DEPT VISIT LOW MDM: CPT

## 2022-08-15 ASSESSMENT — ENCOUNTER SYMPTOMS
PHOTOPHOBIA: 0
APNEA: 0
COUGH: 1
ANAL BLEEDING: 0
EYE REDNESS: 0
RECTAL PAIN: 0
SHORTNESS OF BREATH: 0
EYE DISCHARGE: 0
EYE PAIN: 0
CHEST TIGHTNESS: 0
SINUS PAIN: 0
CHOKING: 0
FACIAL SWELLING: 0
SINUS PRESSURE: 0
ABDOMINAL PAIN: 0
BLOOD IN STOOL: 0

## 2022-08-15 ASSESSMENT — PAIN - FUNCTIONAL ASSESSMENT: PAIN_FUNCTIONAL_ASSESSMENT: NONE - DENIES PAIN

## 2022-08-15 NOTE — ED PROVIDER NOTES
[address] [address]          Pt Name: Radha Echols  MRN: [id number]  [place] 1935  Date of evaluation: 8/15/2022  Treating Resident Physician: Lucio Hanna MD  Supervising Physician: Abel Jaime MD    [address] [place] [address]       Chief Complaint   Patient presents with    Cough     History obtained from the patient. HISTORY OF PRESENT ILLNESS    HPI  Radha Echols is a 81 y.o. male with Pmhx COPD, tobacco dependence who presents to the emergency department for evaluation of cough. Patient to ED with son due to being exposed to [employer]. Patient's wife tested positive for COVID. Patient states that he has been feeling ill since Friday, 3 days ago. He developed a cough at home which was nonproductive. Patient took a home COVID test which was positive today. Denies any shortness of breath. States that he feels healthy and fine. Son bought a home pulse ox and noted patient SPO2 to be 83% on room air. Called family medicine doctor and advised to come to ED for further evaluation. On intake, patient ambulates to room and is noted to have SPO2 of 90%. At rest SPO2 95%. Patient states that he recently was seen by family medicine for swelling in his foot and was put on some steroids. Patient finishes steroid course today. The patient has no other acute complaints at this time. REVIEW OF SYSTEMS   Review of Systems   Constitutional:  Negative for activity change, chills, diaphoresis and fatigue. HENT:  Negative for congestion, ear discharge, ear pain, facial swelling, hearing loss, sinus pressure and sinus pain. Eyes:  Negative for photophobia, pain, discharge and redness. Respiratory:  Positive for cough. Negative for apnea, choking, chest tightness and shortness of breath. Cardiovascular:  Negative for chest pain and leg swelling. Gastrointestinal:  Negative for abdominal pain, anal bleeding, blood in stool and rectal pain. Endocrine: Negative for polydipsia, polyphagia and polyuria. Genitourinary:  Negative for dysuria, flank pain, genital sores and hematuria. Musculoskeletal:  Negative for gait problem, joint swelling, myalgias, neck pain and neck stiffness. Skin:  Negative for pallor, rash and wound. Neurological:  Negative for tremors, seizures, syncope, facial asymmetry and headaches. Hematological:  Negative for adenopathy. Psychiatric/Behavioral:  Negative for agitation, behavioral problems, hallucinations, self-injury and suicidal ideas. PAST MEDICAL AND SURGICAL HISTORY     Past Medical History:   Diagnosis Date    NICKI (acute kidney injury) ([facility] [address] 1/26/2016    COPD (chronic obstructive pulmonary disease) (HCC)     Hyperlipidemia     Kidney stones     Panlobular emphysema ([facility] [address] 1/26/2016    Pneumonia     Tobacco dependence 1/26/2016     Past Surgical History:   Procedure Laterality Date    OTHER SURGICAL HISTORY  1/26/2016    Cystro, bilateral ureteroscopy, bilat laser lithotripsy and basket retrieval of stone fragments, bilateral stents (Dr. Ayanna Ruiz)    [address]  2/17/2016    LEFT ESWL EXTRACOPEAL SHOCK WAVE LITHOTRIPSY    WRIST SURGERY Left     tendons repaired         MEDICATIONS   No current facility-administered medications for this encounter. Current Outpatient Medications:     nirmatrelvir/ritonavir (PAXLOVID) 20 x 150 MG & 10 x 100MG, Take 3 tablets (two 150 mg nirmatrelvir and one 100 mg ritonavir tablets) by mouth every 12 hours for 5 days. , Disp: 30 tablet, Rfl: 0    dextromethorphan-guaiFENesin (MUCINEX DM)  MG per extended release tablet, Take 1 tablet by mouth every 12 hours as needed, Disp: , Rfl:     ipratropium-albuterol (DUONEB) 0.5-2.5 (3) MG/3ML SOLN nebulizer solution, Take 3 mLs by nebulization every 6 hours, Disp: 360 mL, Rfl: 0    albuterol-ipratropium (COMBIVENT RESPIMAT)  MCG/ACT AERS inhaler, Inhale 1 puff into the lungs every 6 hours, Disp: 1 Inhaler, Rfl: 0    tamsulosin (FLOMAX) 0.4 MG capsule, Take 1 capsule by mouth daily, Disp: 90 capsule, Rfl: 3    flavoxate (URISPAS) 100 MG tablet, Take 1 tablet by mouth 3 times daily as needed for Muscle spasms (Dysuria, frequency, urgency), Disp: 30 tablet, Rfl: 1      SOCIAL HISTORY     Social History     Social History Narrative    Not on file     Social History     Tobacco Use    Smoking status: Every Day     Packs/day: 0.50     Years: 65.00     Pack years: 32.50     Types: Cigarettes    Smokeless tobacco: Never    Tobacco comments:     hasn't smoked in 3 days   Substance Use Topics    Alcohol use: No    Drug use: No         ALLERGIES   No Known Allergies      FAMILY HISTORY     Family History   Problem Relation Age of Onset    Heart Disease Mother [de-identified]    Early Death Father         DROWN AGE UNKNOWN    Cancer Neg Hx     Diabetes Neg Hx     High Blood Pressure Neg Hx     Stroke Neg Hx          PREVIOUS RECORDS   Previous records reviewed: I reviewed the patient's past medical records including relevant labs, imaging and procedures. Patient last seen in the emergency room on 6/30/2018 for COPD exacerbation    PHYSICAL EXAM     ED Triage Vitals [08/15/22 1858]   BP Temp Temp src Heart Rate Resp SpO2 Height Weight   (!) 154/81 98.4 °F (36.9 °C) -- 66 18 93 % -- --     Initial vital signs and nursing assessment reviewed and abnormal from hypertension . There is no height or weight on file to calculate BMI. Pulsoximetry is normal per my interpretation. Additional Vital Signs:  Vitals:    08/15/22 1858   BP: (!) 154/81   Pulse: 66   Resp: 18   Temp: 98.4 °F (36.9 °C)   SpO2: 93%       Physical Exam  Constitutional:       Appearance: Normal appearance. He is not diaphoretic. HENT:      Head: Normocephalic and atraumatic. Right Ear: External ear normal.      Left Ear: External ear normal.      Nose: Nose normal. No congestion or rhinorrhea.       Mouth/Throat:      Mouth: Mucous membranes are moist. SPO2 90% on room air. [EL]      ED Course User Index  [EL] Heather Flores MD       MEDICAL DECISION MAKING   Initial Assessment:   81-year-old male chief complaint cough x3 days, COVID-positive test at home today. Given the patient's above chief complaint and findings on history and physical examination, I thought it was appropriate to consider the following emergency medical conditions:  COVID, flu, pneumonia    Although some of these diagnoses are unlikely they were considered in my medical decision making. 81-year-old male chief complaint cough x3 days with exposure to [employer]. COVID-positive test at home today. SPO2 90 to 95% on room air. At this time, patient requesting Paxlovid. I did order a chest x-ray however patient refused. Patient does not qualify for admission. Given prescription for paxlovid and discharged home with strict return precautions. strict return precautions and follow up instructions were discussed with the patient prior to discharge, with which the patient agrees. MEDICATION CHANGES     Discharge Medication List as of 8/15/2022  7:56 PM        START taking these medications    Details   nirmatrelvir/ritonavir (PAXLOVID) 20 x 150 MG & 10 x 100MG Take 3 tablets (two 150 mg nirmatrelvir and one 100 mg ritonavir tablets) by mouth every 12 hours for 5 days. , Disp-30 tablet, R-0Normal               FINAL DISPOSITION     Final diagnoses:   Cough   COVID     Condition: condition: stable  Dispo: Discharge to home      This transcription was electronically signed. Parts of this transcriptions may have been dictated by use of voice recognition software and electronically transcribed, and parts may have been transcribed with the assistance of an ED scribe. The transcription may contain errors not detected in proofreading. Please refer to my supervising physician's documentation if my documentation differs.     Electronically Signed: Toni Klein MD, 08/15/22, 8:09 PM Heather Flores MD  Resident  08/15/22 2009

## 2022-08-15 NOTE — DISCHARGE INSTRUCTIONS
You are seen today in the emergency department for cough, COVID. Please  your medication for Paxlovid tomorrow at the [address]. Please stay isolated while you exhibit symptoms of COVID. Follow-up with your family medicine doctor regarding today's visit.

## 2022-08-15 NOTE — ED NOTES
Patient to the ED with son. Patient was exposed to COVID 19 a few days ago (his wife) and has developed a cough at home. Patient took a home test which was positive today. Patient ambulates to room and is noted to have 90% O2 saturation. Patient at rest elevated to 95%. Patient states \"I feel great. \" patient states he would like something for his cough and be discharged. Patient is resting in chair with easy and unlabored respirations. No distress noted. Call light in reach. Patient denies further complaints or concerns.         Rupali Webster RN  08/15/22 9386

## 2022-08-16 ENCOUNTER — CARE COORDINATION (OUTPATIENT)
Dept: CARE COORDINATION | Age: 87
End: 2022-08-16

## 2022-08-16 NOTE — CARE COORDINATION
Attempted to reach patient for ED follow up in regards to COVID-19 education/ monitoring. Patient was unavailable at the time of my call, and a generic voicemail message was left asking patient to return my call at [phone].

## 2024-03-28 ENCOUNTER — HOSPITAL ENCOUNTER (EMERGENCY)
Dept: HOSPITAL 83 - ED | Age: 89
Discharge: HOME | End: 2024-03-28
Payer: MEDICARE

## 2024-03-28 VITALS — BODY MASS INDEX: 29.03 KG/M2 | WEIGHT: 185 LBS | HEIGHT: 66.97 IN

## 2024-03-28 DIAGNOSIS — I12.9: ICD-10-CM

## 2024-03-28 DIAGNOSIS — Z90.49: ICD-10-CM

## 2024-03-28 DIAGNOSIS — M10.9: ICD-10-CM

## 2024-03-28 DIAGNOSIS — E11.22: ICD-10-CM

## 2024-03-28 DIAGNOSIS — Z95.5: ICD-10-CM

## 2024-03-28 DIAGNOSIS — N18.9: ICD-10-CM

## 2024-03-28 DIAGNOSIS — I16.0: Primary | ICD-10-CM

## 2024-03-28 DIAGNOSIS — F32.A: ICD-10-CM

## 2024-03-28 LAB
ALP SERPL-CCNC: 72 U/L (ref 46–116)
ALT SERPL W P-5'-P-CCNC: 7 U/L (ref 5–49)
BASOPHILS # BLD AUTO: 0.1 10*3/UL (ref 0–0.1)
BASOPHILS NFR BLD AUTO: 1.2 % (ref 0–1)
BUN SERPL-MCNC: 24 MG/DL (ref 9–23)
CHLORIDE SERPL-SCNC: 106 MMOL/L (ref 98–107)
EOSINOPHIL # BLD AUTO: 0.2 10*3/UL (ref 0–0.4)
EOSINOPHIL # BLD AUTO: 3.5 % (ref 1–4)
ERYTHROCYTE [DISTWIDTH] IN BLOOD BY AUTOMATED COUNT: 14.5 % (ref 0–14.5)
HCT VFR BLD AUTO: 40.5 % (ref 42–52)
LYMPHOCYTES # BLD AUTO: 1.3 10*3/UL (ref 1.3–4.4)
LYMPHOCYTES NFR BLD AUTO: 26.5 % (ref 27–41)
MCH RBC QN AUTO: 29.4 PG (ref 27–31)
MCHC RBC AUTO-ENTMCNC: 30.6 G/DL (ref 33–37)
MCV RBC AUTO: 96 FL (ref 80–94)
MONOCYTES # BLD AUTO: 0.4 10*3/UL (ref 0.1–1)
MONOCYTES NFR BLD MANUAL: 8.2 % (ref 3–9)
NEUT #: 2.9 10*3/UL (ref 2.3–7.9)
NEUT %: 60.2 % (ref 47–73)
NRBC BLD QL AUTO: 0 % (ref 0–0)
PLATELET # BLD AUTO: 184 10*3/UL (ref 130–400)
PMV BLD AUTO: 9.2 FL (ref 9.6–12.3)
POTASSIUM SERPL-SCNC: 3.9 MMOL/L (ref 3.4–5.1)
PROT SERPL-MCNC: 7.4 GM/DL (ref 6–8)
RBC # BLD AUTO: 4.22 10*6/UL (ref 4.5–5.9)
WBC NRBC COR # BLD AUTO: 4.9 10*3/UL (ref 4.8–10.8)

## 2024-06-20 ENCOUNTER — OFFICE VISIT (OUTPATIENT)
Dept: NEUROSURGERY | Age: 89
End: 2024-06-20
Payer: MEDICARE

## 2024-06-20 VITALS — RESPIRATION RATE: 16 BRPM | BODY MASS INDEX: 29.03 KG/M2 | WEIGHT: 185 LBS | HEIGHT: 67 IN

## 2024-06-20 DIAGNOSIS — M54.42 CHRONIC MIDLINE LOW BACK PAIN WITH BILATERAL SCIATICA: Primary | ICD-10-CM

## 2024-06-20 DIAGNOSIS — G89.29 CHRONIC MIDLINE LOW BACK PAIN WITH BILATERAL SCIATICA: Primary | ICD-10-CM

## 2024-06-20 DIAGNOSIS — M54.41 CHRONIC MIDLINE LOW BACK PAIN WITH BILATERAL SCIATICA: Primary | ICD-10-CM

## 2024-06-20 PROCEDURE — 1123F ACP DISCUSS/DSCN MKR DOCD: CPT | Performed by: NEUROLOGICAL SURGERY

## 2024-06-20 PROCEDURE — 99204 OFFICE O/P NEW MOD 45 MIN: CPT | Performed by: NEUROLOGICAL SURGERY

## 2024-06-20 PROCEDURE — G8427 DOCREV CUR MEDS BY ELIG CLIN: HCPCS | Performed by: NEUROLOGICAL SURGERY

## 2024-06-20 PROCEDURE — G8419 CALC BMI OUT NRM PARAM NOF/U: HCPCS | Performed by: NEUROLOGICAL SURGERY

## 2024-06-20 PROCEDURE — 99024 POSTOP FOLLOW-UP VISIT: CPT | Performed by: NEUROLOGICAL SURGERY

## 2024-06-20 PROCEDURE — 4004F PT TOBACCO SCREEN RCVD TLK: CPT | Performed by: NEUROLOGICAL SURGERY

## 2024-06-20 RX ORDER — AMLODIPINE BESYLATE 10 MG/1
TABLET ORAL
COMMUNITY
Start: 2024-05-09

## 2024-06-20 RX ORDER — ATENOLOL 25 MG/1
25 TABLET ORAL DAILY
COMMUNITY
End: 2024-06-20 | Stop reason: CLARIF

## 2024-06-20 RX ORDER — GLIMEPIRIDE 4 MG/1
4 TABLET ORAL
COMMUNITY

## 2024-06-20 RX ORDER — ALLOPURINOL 100 MG/1
100 TABLET ORAL DAILY PRN
COMMUNITY

## 2024-06-20 RX ORDER — HYDROCODONE BITARTRATE AND ACETAMINOPHEN 5; 325 MG/1; MG/1
TABLET ORAL
COMMUNITY
Start: 2024-05-17

## 2024-06-20 RX ORDER — HYDRALAZINE HYDROCHLORIDE 50 MG/1
TABLET, FILM COATED ORAL
COMMUNITY

## 2024-06-20 RX ORDER — ROSUVASTATIN CALCIUM 20 MG/1
TABLET, COATED ORAL
COMMUNITY
Start: 2024-06-10

## 2024-06-20 RX ORDER — METOPROLOL SUCCINATE 50 MG/1
TABLET, EXTENDED RELEASE ORAL
COMMUNITY
Start: 2024-04-03

## 2024-06-20 RX ORDER — INDOMETHACIN 50 MG/1
CAPSULE ORAL
COMMUNITY
Start: 2024-04-12

## 2024-06-20 ASSESSMENT — ENCOUNTER SYMPTOMS
RESPIRATORY NEGATIVE: 1
ALLERGIC/IMMUNOLOGIC NEGATIVE: 1
GASTROINTESTINAL NEGATIVE: 1
EYES NEGATIVE: 1

## 2024-06-21 NOTE — PROGRESS NOTES
Willy De Oliveira (:  1935) is a 89 y.o. male,New patient, here for evaluation of the following chief complaint(s):  New Patient (Here to discuss spinal cord stimulator  )      Assessment & Plan   1. Chronic midline low back pain with bilateral sciatica  89 year old male who presents with back pain.  The pain is rated as 8/10.  The pain radiates into the legs.  The right leg is worse.  He had a spinal cord stimulator trial with over 60% improvement in his pain and his functional status improved with regards to his ability to groom and care for himself.  I am recommending T9 laminectomy for placement of [employer] spinal cord stimulator. He will think about it.  Risks, benefits and alternatives have been discussed.     No follow-ups on file.       Subjective   HPI  89 year old male who presents with back pain.  The pain radiates into both legs.  The right side is worse than the left. The pain is rated as 8/10.  The pain is made worse with activity and better with rest.  He has tried Pt and epidural injections. He had a spinal cord stimulator trial at T7 with [employer] and he had more than 60% improvement in pain.     Review of Systems   Constitutional: Negative.    HENT: Negative.     Eyes: Negative.    Respiratory: Negative.     Cardiovascular: Negative.    Gastrointestinal: Negative.    Endocrine: Negative.    Genitourinary: Negative.    Musculoskeletal:  Positive for arthralgias.   Skin: Negative.    Allergic/Immunologic: Negative.    Neurological: Negative.    Hematological:  Bruises/bleeds easily.   Psychiatric/Behavioral: Negative.            Objective   Physical Exam  Vitals reviewed.   Constitutional:       General: He is not in acute distress.     Appearance: Normal appearance. He is normal weight. He is not ill-appearing, toxic-appearing or diaphoretic.   HENT:      Head: Normocephalic and atraumatic.      Nose: Nose normal. No congestion.      Mouth/Throat:      Mouth: Mucous

## 2024-07-09 ENCOUNTER — TELEPHONE (OUTPATIENT)
Dept: NEUROSURGERY | Age: 89
End: 2024-07-09

## 2024-07-09 NOTE — TELEPHONE ENCOUNTER
Patient's daughter called and states that patient would like to go through with SCS placement, please contact patient will date of surgery.

## 2024-07-10 ENCOUNTER — TELEPHONE (OUTPATIENT)
Dept: NEUROSURGERY | Age: 89
End: 2024-07-10

## 2024-07-10 ENCOUNTER — PREP FOR PROCEDURE (OUTPATIENT)
Dept: NEUROSURGERY | Age: 89
End: 2024-07-10

## 2024-07-10 DIAGNOSIS — Z01.818 PRE-OP TESTING: Primary | ICD-10-CM

## 2024-07-10 PROBLEM — G89.29 CHRONIC BACK PAIN: Status: ACTIVE | Noted: 2024-07-10

## 2024-07-10 PROBLEM — M54.9 CHRONIC BACK PAIN: Status: ACTIVE | Noted: 2024-07-10

## 2024-07-10 RX ORDER — SODIUM CHLORIDE 0.9 % (FLUSH) 0.9 %
5-40 SYRINGE (ML) INJECTION EVERY 12 HOURS SCHEDULED
Status: CANCELLED | OUTPATIENT
Start: 2024-07-10

## 2024-07-10 RX ORDER — SODIUM CHLORIDE 9 MG/ML
INJECTION, SOLUTION INTRAVENOUS CONTINUOUS
Status: CANCELLED | OUTPATIENT
Start: 2024-07-10

## 2024-07-10 RX ORDER — SODIUM CHLORIDE 9 MG/ML
INJECTION, SOLUTION INTRAVENOUS PRN
Status: CANCELLED | OUTPATIENT
Start: 2024-07-10

## 2024-07-10 RX ORDER — SODIUM CHLORIDE 0.9 % (FLUSH) 0.9 %
5-40 SYRINGE (ML) INJECTION PRN
Status: CANCELLED | OUTPATIENT
Start: 2024-07-10

## 2024-07-10 NOTE — TELEPHONE ENCOUNTER
Prior Authorization Form:      DEMOGRAPHICS:                     Patient Name:  Tony De Oliveira  Patient :  1935            Insurance:  Payor: [employer] / Plan: [employer] NATE - EXCHANGE / Product Type: *No Product type* /   Insurance ID Number:    Payer/Plan Subscr  Sex Relation Sub. Ins. ID Effective Group Num   1. MEDICAL MUTUATONY HACKETT 1935 Male Self [id number]                                   [address]   2. MEDICARE - METONY HACKETT 1935 Male Self [id number]                                    [address] [address]         DIAGNOSIS & PROCEDURE:                       Procedure/Operation: T9 laminectomy for placement of T7 SCS           CPT Code: 14240, 27006    Diagnosis:  chronic midline low back pain with sciatica    ICD10 Code: M54.89    Location:  [facility]    Surgeon:  Nelia    SCHEDULING INFORMATION:                          Date: 24                Anesthesia:  general                                                       Status:  Outpatient        Special Comments:  [employer]:  Coverage, 1-paddle, 32-electrodes       Electronically signed by Lashawn Kirby MA on 7/10/2024 at 3:50 PM

## 2024-07-31 NOTE — PROGRESS NOTES
[facility]   PRE-ADMISSION TESTING GENERAL INSTRUCTIONS  PAT Phone Number: [phone]        GENERAL INSTRUCTIONS:     [x] Antibacterial Soap Shower the Night before AND the morning of surgery.  [x] Do not wear lotions, powders, deodorant the morning of surgery.  [x] No solid food after midnight. You may have SIPS of clear liquids up until 2 hours before your arrival time to the hospital.   [x] You may brush your teeth, gargle, but do not swallow water.   [x] No tobacco products within 24 hours of your surgery - NO CHEW AFTER MIDNIGHT  [x] Jewelry or valuables should not be brought to the hospital.   [x] Arrange transportation with a responsible adult  to and from the hospital. Arrange for someone to be with you for the remainder of the day and for 24 hours after your procedure due to having had anesthesia.          -Who will be your  for transportation? Torri, daughter.        -Who will be staying with you for 24 hrs after your procedure? Torri.  [x] Bring insurance card and photo ID.  [x] Transfusion (Green) Bracelet: Please bring with you to hospital, day of surgery.     PARKING INSTRUCTIONS:      [x] ARRIVAL DATE & TIME: THURSDAY, 8/8 at 7:15 am.  [x] Times are subject to change. We will contact you the business day before surgery if that were to occur.  [x] Enter into the [facility] Entrance. Two people may accompany you. Masks are not required.  [x] Parking Lot \"I\" is where you will park. It is located on the corner of [address] and [address]. The entrance is on [address].   Only one vehicle - per patient, is permitted in parking lot.   Upon entering the parking lot, a voucher ticket will print.     EDUCATION INSTRUCTIONS:                                 [x] [facility] Pamphlet placed in chart.  [x] Pre-admission Testing educational folder given.  [x] Incentive Spirometry,coughing & deep breathing exercises reviewed.  [x]

## 2024-08-01 ENCOUNTER — HOSPITAL ENCOUNTER (OUTPATIENT)
Dept: PREADMISSION TESTING | Age: 89
Discharge: HOME | End: 2024-08-01

## 2024-08-02 ENCOUNTER — HOSPITAL ENCOUNTER (OUTPATIENT)
Dept: PREADMISSION TESTING | Age: 89
Discharge: HOME | End: 2024-08-02
Payer: MEDICARE

## 2024-08-02 ENCOUNTER — HOSPITAL ENCOUNTER (OUTPATIENT)
Dept: GENERAL RADIOLOGY | Age: 89
End: 2024-08-02
Payer: MEDICARE

## 2024-08-02 VITALS
SYSTOLIC BLOOD PRESSURE: 148 MMHG | DIASTOLIC BLOOD PRESSURE: 80 MMHG | WEIGHT: 172 LBS | OXYGEN SATURATION: 95 % | BODY MASS INDEX: 26.94 KG/M2 | HEART RATE: 63 BPM | RESPIRATION RATE: 20 BRPM | TEMPERATURE: 97.8 F

## 2024-08-02 DIAGNOSIS — Z01.818 PRE-OP TESTING: ICD-10-CM

## 2024-08-02 LAB
ABO + RH BLD: NORMAL
ANION GAP SERPL CALCULATED.3IONS-SCNC: 15 MMOL/L (ref 7–16)
ARM BAND NUMBER: NORMAL
BASOPHILS # BLD: 0.08 K/UL (ref 0–0.2)
BASOPHILS NFR BLD: 1 % (ref 0–2)
BILIRUB UR QL STRIP: NEGATIVE
BLOOD BANK SAMPLE EXPIRATION: NORMAL
BLOOD GROUP ANTIBODIES SERPL: NEGATIVE
BUN SERPL-MCNC: 33 MG/DL (ref 6–23)
CALCIUM SERPL-MCNC: 9 MG/DL (ref 8.6–10.2)
CHLORIDE SERPL-SCNC: 103 MMOL/L (ref 98–107)
CLARITY UR: CLEAR
CO2 SERPL-SCNC: 22 MMOL/L (ref 22–29)
COLOR UR: YELLOW
CREAT SERPL-MCNC: 2.9 MG/DL (ref 0.7–1.2)
EKG ATRIAL RATE: 59 BPM
EKG P AXIS: 24 DEGREES
EKG P-R INTERVAL: 146 MS
EKG Q-T INTERVAL: 462 MS
EKG QRS DURATION: 110 MS
EKG QTC CALCULATION (BAZETT): 457 MS
EKG R AXIS: 16 DEGREES
EKG T AXIS: 55 DEGREES
EKG VENTRICULAR RATE: 59 BPM
EOSINOPHIL # BLD: 0.17 K/UL (ref 0.05–0.5)
EOSINOPHILS RELATIVE PERCENT: 2 % (ref 0–6)
ERYTHROCYTE [DISTWIDTH] IN BLOOD BY AUTOMATED COUNT: 14.6 % (ref 11.5–15)
GFR, ESTIMATED: 21 ML/MIN/1.73M2
GLUCOSE SERPL-MCNC: 134 MG/DL (ref 74–99)
GLUCOSE UR STRIP-MCNC: 250 MG/DL
HCT VFR BLD AUTO: 37.9 % (ref 37–54)
HGB BLD-MCNC: 12.2 G/DL (ref 12.5–16.5)
HGB UR QL STRIP.AUTO: ABNORMAL
IMM GRANULOCYTES # BLD AUTO: 0.03 K/UL (ref 0–0.58)
IMM GRANULOCYTES NFR BLD: 0 % (ref 0–5)
INR PPP: 1
KETONES UR STRIP-MCNC: NEGATIVE MG/DL
LEUKOCYTE ESTERASE UR QL STRIP: ABNORMAL
LYMPHOCYTES NFR BLD: 0.91 K/UL (ref 1.5–4)
LYMPHOCYTES RELATIVE PERCENT: 12 % (ref 20–42)
MCH RBC QN AUTO: 30 PG (ref 26–35)
MCHC RBC AUTO-ENTMCNC: 32.2 G/DL (ref 32–34.5)
MCV RBC AUTO: 93.1 FL (ref 80–99.9)
MONOCYTES NFR BLD: 0.48 K/UL (ref 0.1–0.95)
MONOCYTES NFR BLD: 6 % (ref 2–12)
NEUTROPHILS NFR BLD: 78 % (ref 43–80)
NEUTS SEG NFR BLD: 5.87 K/UL (ref 1.8–7.3)
NITRITE UR QL STRIP: NEGATIVE
PH UR STRIP: 6 [PH] (ref 5–9)
PLATELET # BLD AUTO: 227 K/UL (ref 130–450)
PMV BLD AUTO: 9.5 FL (ref 7–12)
POTASSIUM SERPL-SCNC: 3.9 MMOL/L (ref 3.5–5)
PROT UR STRIP-MCNC: >=300 MG/DL
PROTHROMBIN TIME: 10.6 SEC (ref 9.3–12.4)
RBC # BLD AUTO: 4.07 M/UL (ref 3.8–5.8)
RBC #/AREA URNS HPF: ABNORMAL /HPF
SODIUM SERPL-SCNC: 140 MMOL/L (ref 132–146)
SP GR UR STRIP: 1.02 (ref 1–1.03)
UROBILINOGEN UR STRIP-ACNC: 0.2 EU/DL (ref 0–1)
WBC #/AREA URNS HPF: ABNORMAL /HPF
WBC OTHER # BLD: 7.5 K/UL (ref 4.5–11.5)

## 2024-08-02 PROCEDURE — 86850 RBC ANTIBODY SCREEN: CPT

## 2024-08-02 PROCEDURE — 87086 URINE CULTURE/COLONY COUNT: CPT

## 2024-08-02 PROCEDURE — 80048 BASIC METABOLIC PNL TOTAL CA: CPT

## 2024-08-02 PROCEDURE — 86901 BLOOD TYPING SEROLOGIC RH(D): CPT

## 2024-08-02 PROCEDURE — 71046 X-RAY EXAM CHEST 2 VIEWS: CPT

## 2024-08-02 PROCEDURE — 81001 URINALYSIS AUTO W/SCOPE: CPT

## 2024-08-02 PROCEDURE — 93005 ELECTROCARDIOGRAM TRACING: CPT

## 2024-08-02 PROCEDURE — 85610 PROTHROMBIN TIME: CPT

## 2024-08-02 PROCEDURE — 86900 BLOOD TYPING SEROLOGIC ABO: CPT

## 2024-08-02 PROCEDURE — 85025 COMPLETE CBC W/AUTO DIFF WBC: CPT

## 2024-08-02 PROCEDURE — 36415 COLL VENOUS BLD VENIPUNCTURE: CPT

## 2024-08-02 NOTE — PROGRESS NOTES
CBC/D, PT/INR, BMP, urinalysis, microscopic urinalysis and preliminary EKG results faxed to Dr. Billy Iniguez to number [phone].  Fax confirmation received.   I also left a voicemail message on Dr. Iniguez's answering machine (office closed) regarding labs and EKG results.  I requested that someone from the office return the call to [facility] on Monday.

## 2024-08-02 NOTE — PROGRESS NOTES
Dr. Storey notified of preliminary EKG reading.  I informed Dr. Storey that we were still waiting for medical clearance.  Per Dr. Storey, please make sure primary care physician sees EKG.

## 2024-08-03 LAB
MICROORGANISM SPEC CULT: ABNORMAL
SERVICE CMNT-IMP: ABNORMAL
SPECIMEN DESCRIPTION: ABNORMAL

## 2024-08-06 ENCOUNTER — TELEPHONE (OUTPATIENT)
Dept: NEUROSURGERY | Age: 89
End: 2024-08-06

## 2024-08-06 NOTE — TELEPHONE ENCOUNTER
Debby from Dr Iniguez's office called stating patient is not cleared for surgery on 8/8/24 with Dr Pickens due to kidney functions being off. Debby stated they did leave a message with the daughter.  Surgery will be canceled and if patient is cleared at some point Dr Iniguez's office will fax a clearance.  Attempted to call daughter but phone rang and was unable to leave a voicemail.    07-Sep-2019 07:02

## 2024-08-06 NOTE — TELEPHONE ENCOUNTER
Patient returned Lashawn's call, however, she was on the phone. I told patient I would have Lashawn call him back. Please call [phone]. Thank you.

## 2024-08-16 ENCOUNTER — HOSPITAL ENCOUNTER (OUTPATIENT)
Dept: HOSPITAL 83 - US | Age: 89
Discharge: HOME | End: 2024-08-16
Attending: STUDENT IN AN ORGANIZED HEALTH CARE EDUCATION/TRAINING PROGRAM
Payer: MEDICARE

## 2024-08-16 DIAGNOSIS — N40.0: ICD-10-CM

## 2024-08-16 DIAGNOSIS — N28.1: ICD-10-CM

## 2024-08-16 DIAGNOSIS — N18.32: ICD-10-CM

## 2024-08-16 DIAGNOSIS — N32.89: ICD-10-CM

## 2024-08-16 DIAGNOSIS — N20.0: Primary | ICD-10-CM

## 2024-10-04 ENCOUNTER — HOSPITAL ENCOUNTER (OUTPATIENT)
Dept: HOSPITAL 83 - LAB | Age: 89
Discharge: HOME | End: 2024-10-04
Attending: INTERNAL MEDICINE
Payer: MEDICARE

## 2024-10-04 DIAGNOSIS — R73.9: ICD-10-CM

## 2024-10-04 DIAGNOSIS — N17.0: ICD-10-CM

## 2024-10-04 DIAGNOSIS — M25.512: Primary | ICD-10-CM

## 2024-10-04 DIAGNOSIS — I16.1: ICD-10-CM

## 2024-10-04 LAB
ALP SERPL-CCNC: 68 U/L (ref 46–116)
ALT SERPL W P-5'-P-CCNC: 11 U/L (ref 5–49)
BASOPHILS # BLD AUTO: 0 10*3/UL (ref 0–0.1)
BASOPHILS NFR BLD AUTO: 0.6 % (ref 0–1)
BUN SERPL-MCNC: 41 MG/DL (ref 9–23)
CHLORIDE SERPL-SCNC: 107 MMOL/L (ref 98–107)
EOSINOPHIL # BLD AUTO: 0.2 10*3/UL (ref 0–0.4)
EOSINOPHIL # BLD AUTO: 2.6 % (ref 1–4)
ERYTHROCYTE [DISTWIDTH] IN BLOOD BY AUTOMATED COUNT: 15.5 % (ref 0–14.5)
HCT VFR BLD AUTO: 31.7 % (ref 42–52)
LYMPHOCYTES # BLD AUTO: 1.3 10*3/UL (ref 1.3–4.4)
LYMPHOCYTES NFR BLD AUTO: 20.4 % (ref 27–41)
MCH RBC QN AUTO: 29.9 PG (ref 27–31)
MCHC RBC AUTO-ENTMCNC: 32.2 G/DL (ref 33–37)
MCV RBC AUTO: 93 FL (ref 80–94)
MONOCYTES # BLD AUTO: 0.5 10*3/UL (ref 0.1–1)
MONOCYTES NFR BLD MANUAL: 8.1 % (ref 3–9)
NEUT #: 4.2 10*3/UL (ref 2.3–7.9)
NEUT %: 68 % (ref 47–73)
NRBC BLD QL AUTO: 0 10*3/UL (ref 0–0)
PLATELET # BLD AUTO: 195 10*3/UL (ref 130–400)
PMV BLD AUTO: 9.3 FL (ref 9.6–12.3)
POTASSIUM SERPL-SCNC: 4.2 MMOL/L (ref 3.4–5.1)
PROT SERPL-MCNC: 6.7 GM/DL (ref 6–8)
RBC # BLD AUTO: 3.41 10*6/UL (ref 4.5–5.9)
WBC NRBC COR # BLD AUTO: 6.2 10*3/UL (ref 4.8–10.8)

## 2025-03-15 ENCOUNTER — HOSPITAL ENCOUNTER (EMERGENCY)
Dept: HOSPITAL 83 - ED | Age: 89
End: 2025-03-15
Payer: MEDICARE

## 2025-03-15 VITALS — WEIGHT: 183 LBS | HEIGHT: 60 IN

## 2025-03-15 DIAGNOSIS — M10.9: ICD-10-CM

## 2025-03-15 DIAGNOSIS — F32.A: ICD-10-CM

## 2025-03-15 DIAGNOSIS — Z87.891: ICD-10-CM

## 2025-03-15 DIAGNOSIS — Z95.5: ICD-10-CM

## 2025-03-15 DIAGNOSIS — Z88.8: ICD-10-CM

## 2025-03-15 DIAGNOSIS — E11.9: ICD-10-CM

## 2025-03-15 DIAGNOSIS — I10: ICD-10-CM

## 2025-03-15 DIAGNOSIS — Z98.890: ICD-10-CM

## 2025-03-15 DIAGNOSIS — Z90.49: ICD-10-CM

## 2025-03-15 DIAGNOSIS — I46.9: Primary | ICD-10-CM
